# Patient Record
Sex: FEMALE | Race: WHITE
[De-identification: names, ages, dates, MRNs, and addresses within clinical notes are randomized per-mention and may not be internally consistent; named-entity substitution may affect disease eponyms.]

---

## 2017-02-27 ENCOUNTER — HOSPITAL ENCOUNTER (OUTPATIENT)
Facility: CLINIC | Age: 37
Discharge: HOME | End: 2017-02-27
Payer: COMMERCIAL

## 2017-02-27 ENCOUNTER — HOSPITAL ENCOUNTER (OUTPATIENT)
Dept: GENERAL RADIOLOGY | Facility: CLINIC | Age: 37
Discharge: HOME | End: 2017-02-27
Payer: COMMERCIAL

## 2017-02-27 DIAGNOSIS — M25.562 LEFT KNEE PAIN, UNSPECIFIED CHRONICITY: ICD-10-CM

## 2017-02-27 PROCEDURE — 73562 X-RAY EXAM OF KNEE 3: CPT

## 2017-02-27 PROCEDURE — 73562 X-RAY EXAM OF KNEE 3: CPT | Performed by: RADIOLOGY

## 2025-01-10 ENCOUNTER — TELEPHONE (OUTPATIENT)
Age: 45
End: 2025-01-10

## 2025-01-10 NOTE — TELEPHONE ENCOUNTER
Please let patient know that I refilled her prescription from [facility] but she is coming up on due for follow up. If due for annual, please schedule that way. Last seen 2/2024.

## 2025-02-11 RX ORDER — ESCITALOPRAM OXALATE 20 MG/1
20 TABLET ORAL DAILY
Qty: 30 TABLET | Refills: 1 | Status: SHIPPED | OUTPATIENT
Start: 2025-02-11

## 2025-02-17 NOTE — PROGRESS NOTES
[facility]   [address] [place] [address]  Dept: [phone]   Patient:  Aye Guallpa   :  1980   Visit Date:  2025     VISIT - ANNUAL WELL EXAM  CC: This is an annual well patient visit.     HPI:   Patient is here for an annual exam. Patient is on HRT from [facility].  She has been off of her blood pressure medication for a couple months due to her PCP no longer in the office.  Otherwise it was well controlled on medication. She denies any pelvic pain or vaginal bleeding. She has had a hysterectomy due to endometriosis. No breast concerns. She would like a mammogram order. She is sexually active, with one male partner. No concerns for STIs, no PCB, no dyspareunia. No urinary concerns. No bowel concerns. She has had her first colonoscopy about 2 years ago - and is now due in 8 years. She is active with exercise but would like to increase this. Diet is balanced. She does take a calcium and vitamin D supplement. Sleeping well, mood is good, feels safe at home. She is a smoker; she smokes a 1/2 pack per day for the last year. No ongoing bloating or abdominal pain.     History reviewed. No pertinent surgical history.     History reviewed. No pertinent past medical history.       Overall state of well-being reviewed. Fall & depression assessments confirmed. Dietary & nutritional habits reviewed & fist-size portions of lean protein, fruits & vegetables, & carbs with each meal recommended. Low glycemic indexed foods recommended for snacks. Water intake discussed with caffeine & alcohol moderation discussed. Weight bearing exercise routines of at least 30 minutes 3 times a week discussed for healthy weight maintenance.  Weight reduction through exercise, a healthy diet, & evidenced based programs like Weight Watchers & NOOM discussed as necessary. Breast symptoms, abnormal abdominal

## 2025-02-18 ENCOUNTER — OFFICE VISIT (OUTPATIENT)
Age: 45
End: 2025-02-18
Payer: COMMERCIAL

## 2025-02-18 ENCOUNTER — HOSPITAL ENCOUNTER (OUTPATIENT)
Age: 45
Setting detail: SPECIMEN
Discharge: HOME | End: 2025-02-18

## 2025-02-18 VITALS
WEIGHT: 273.6 LBS | DIASTOLIC BLOOD PRESSURE: 104 MMHG | OXYGEN SATURATION: 100 % | SYSTOLIC BLOOD PRESSURE: 190 MMHG | HEART RATE: 93 BPM

## 2025-02-18 DIAGNOSIS — Z12.31 ENCOUNTER FOR SCREENING MAMMOGRAM FOR MALIGNANT NEOPLASM OF BREAST: ICD-10-CM

## 2025-02-18 DIAGNOSIS — Z79.890 HORMONE REPLACEMENT THERAPY (HRT): ICD-10-CM

## 2025-02-18 DIAGNOSIS — Z13.820 SCREENING FOR OSTEOPOROSIS: ICD-10-CM

## 2025-02-18 DIAGNOSIS — Z01.419 WELL WOMAN EXAM WITH ROUTINE GYNECOLOGICAL EXAM: Primary | ICD-10-CM

## 2025-02-18 DIAGNOSIS — Z78.0 MENOPAUSE: ICD-10-CM

## 2025-02-18 DIAGNOSIS — R68.89 BLOOD PRESSURE ALTERATION: ICD-10-CM

## 2025-02-18 PROCEDURE — 99396 PREV VISIT EST AGE 40-64: CPT

## 2025-02-18 RX ORDER — RAMIPRIL 5 MG/1
5 CAPSULE ORAL DAILY
COMMUNITY
Start: 2024-11-17

## 2025-02-18 RX ORDER — ROSUVASTATIN CALCIUM 5 MG/1
5 TABLET, COATED ORAL DAILY
COMMUNITY
Start: 2024-12-16

## 2025-02-18 ASSESSMENT — ENCOUNTER SYMPTOMS: GASTROINTESTINAL NEGATIVE: 1

## 2025-02-20 LAB
HPV I/H RISK 4 DNA CVX QL NAA+PROBE: NOT DETECTED
HPV SAMPLE: NORMAL
HPV, INTERPRETATION: NORMAL
HPV16 DNA CVX QL NAA+PROBE: NOT DETECTED
HPV18 DNA CVX QL NAA+PROBE: NOT DETECTED
SPECIMEN DESCRIPTION: NORMAL

## 2025-02-22 LAB — CYTOLOGY REPORT: NORMAL

## 2025-02-25 ENCOUNTER — TELEPHONE (OUTPATIENT)
Age: 45
End: 2025-02-25

## 2025-02-25 NOTE — TELEPHONE ENCOUNTER
Called and spoke with pt, she did f/u with ER visit d/t unable to calm down. Pt states that she has been a pt here for a long time and never had an experience like this. Pt states that once she was told her BP was extremely high that in turn increased her anxiety and then provider continued to do her pap/annual exam. Pt felt that things could have been handled very different, I apologized several times. Pt has f/u with pcp and was started on a BP/anxiety medication

## 2025-02-25 NOTE — TELEPHONE ENCOUNTER
Please call patient and ask if she has been seen by a provider about her high BP; what has been her last BP?

## 2025-02-26 NOTE — TELEPHONE ENCOUNTER
Attempted to call patient to listen to her experience and apologize. Left a short VM, to apologize for her experience and asked her to return our call IF she would like to discuss further.

## 2025-04-14 RX ORDER — ESCITALOPRAM OXALATE 20 MG/1
20 TABLET ORAL DAILY
Qty: 30 TABLET | Refills: 1 | Status: SHIPPED | OUTPATIENT
Start: 2025-04-14

## 2025-06-07 ENCOUNTER — OFFICE VISIT (OUTPATIENT)
Age: 45
End: 2025-06-07

## 2025-06-07 VITALS
WEIGHT: 273 LBS | TEMPERATURE: 98.6 F | HEART RATE: 93 BPM | SYSTOLIC BLOOD PRESSURE: 175 MMHG | HEIGHT: 68 IN | OXYGEN SATURATION: 92 % | DIASTOLIC BLOOD PRESSURE: 88 MMHG | BODY MASS INDEX: 41.37 KG/M2

## 2025-06-07 DIAGNOSIS — J02.0 PHARYNGITIS DUE TO STREPTOCOCCUS SPECIES: Primary | ICD-10-CM

## 2025-06-07 DIAGNOSIS — R03.0 ELEVATED BLOOD PRESSURE READING: ICD-10-CM

## 2025-06-07 LAB — S PYO AG THROAT QL: NORMAL

## 2025-06-07 RX ORDER — METOPROLOL SUCCINATE 25 MG/1
50 TABLET, EXTENDED RELEASE ORAL DAILY
COMMUNITY

## 2025-06-07 RX ORDER — CEFTRIAXONE 1 G/1
1000 INJECTION, POWDER, FOR SOLUTION INTRAMUSCULAR; INTRAVENOUS ONCE
Status: COMPLETED | OUTPATIENT
Start: 2025-06-07 | End: 2025-06-07

## 2025-06-07 RX ADMIN — CEFTRIAXONE 1000 MG: 1 INJECTION, POWDER, FOR SOLUTION INTRAMUSCULAR; INTRAVENOUS at 09:21

## 2025-06-07 ASSESSMENT — ENCOUNTER SYMPTOMS
NAUSEA: 0
COUGH: 0
RHINORRHEA: 0
SORE THROAT: 1
SHORTNESS OF BREATH: 0

## 2025-06-07 NOTE — PROGRESS NOTES
[facility], [facility] ([facility])  [facility] [place] [id number]  Dept: [phone]    Date: 25    Aye Guallpa (:  1980) is a 44 y.o. female, here for evaluation of the following chief complaint(s):  Pharyngitis and Fever      HPI    History provided by:  Patient  Pharyngitis  Severity:  Severe  Duration:  2 days  Timing:  Constant  Progression:  Worsening  Chronicity:  New  Associated symptoms: fever and sore throat    Associated symptoms: no chest pain, no congestion, no cough, no ear pain, no fatigue, no headaches, no nausea, no rash, no rhinorrhea and no shortness of breath           Past Medical History:   Diagnosis Date    Anxiety     Asymptomatic bacteriuria     Cyst of Bartholin gland or duct     Hyperlipidemia     Hypertension     Menopause     Urinary retention     Vitamin D deficiency          ROS  Review of Systems   Constitutional:  Positive for fever. Negative for fatigue.   HENT:  Positive for sore throat. Negative for congestion, ear pain and rhinorrhea.    Respiratory:  Negative for cough and shortness of breath.    Cardiovascular:  Negative for chest pain and palpitations.   Gastrointestinal:  Negative for nausea.   Skin:  Negative for rash.   Neurological:  Negative for dizziness and headaches.       PHYSICAL EXAM  Vitals:    25 0847 25 0854   BP: (!) 171/122 (!) 175/88   Pulse: 93    Temp: 98.6 °F (37 °C)    SpO2: 92%    Weight: 123.8 kg (273 lb)    Height: 1.727 m (5' 8\")      Physical Exam  Constitutional:       General: She is not in acute distress.     Appearance: Normal appearance.   HENT:      Head: Normocephalic.      Right Ear: Tympanic membrane normal.      Left Ear: Tympanic membrane normal.      Nose: Nose normal.      Mouth/Throat:      Pharynx: Pharyngeal swelling and posterior oropharyngeal erythema present.      Tonsils: Tonsillar exudate present. 2+ on the right. 2+ on the left.   Eyes:      Extraocular

## 2025-06-12 RX ORDER — ESCITALOPRAM OXALATE 20 MG/1
20 TABLET ORAL DAILY
Qty: 30 TABLET | Refills: 1 | Status: SHIPPED | OUTPATIENT
Start: 2025-06-12

## 2025-08-08 RX ORDER — ESCITALOPRAM OXALATE 20 MG/1
20 TABLET ORAL DAILY
Qty: 30 TABLET | Refills: 1 | Status: SHIPPED | OUTPATIENT
Start: 2025-08-08